# Patient Record
Sex: FEMALE | Race: WHITE | NOT HISPANIC OR LATINO | Employment: STUDENT | URBAN - METROPOLITAN AREA
[De-identification: names, ages, dates, MRNs, and addresses within clinical notes are randomized per-mention and may not be internally consistent; named-entity substitution may affect disease eponyms.]

---
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
## 2023-01-30 ENCOUNTER — OFFICE VISIT (OUTPATIENT)
Dept: URGENT CARE | Facility: CLINIC | Age: 18
End: 2023-01-30
Payer: COMMERCIAL

## 2023-01-30 VITALS
HEIGHT: 63 IN | BODY MASS INDEX: 17.72 KG/M2 | OXYGEN SATURATION: 98 % | TEMPERATURE: 98 F | SYSTOLIC BLOOD PRESSURE: 106 MMHG | WEIGHT: 100 LBS | HEART RATE: 98 BPM | DIASTOLIC BLOOD PRESSURE: 72 MMHG

## 2023-01-30 DIAGNOSIS — J32.9 SINUSITIS, UNSPECIFIED CHRONICITY, UNSPECIFIED LOCATION: Primary | ICD-10-CM

## 2023-01-30 DIAGNOSIS — J30.9 ALLERGIC RHINITIS, UNSPECIFIED SEASONALITY, UNSPECIFIED TRIGGER: ICD-10-CM

## 2023-01-30 DIAGNOSIS — R05.9 COUGH, UNSPECIFIED TYPE: ICD-10-CM

## 2023-01-30 LAB
CTP QC/QA: YES
SARS-COV-2 AG RESP QL IA.RAPID: NEGATIVE

## 2023-01-30 PROCEDURE — 1160F RVW MEDS BY RX/DR IN RCRD: CPT | Mod: CPTII,S$GLB,, | Performed by: FAMILY MEDICINE

## 2023-01-30 PROCEDURE — 3078F DIAST BP <80 MM HG: CPT | Mod: CPTII,S$GLB,, | Performed by: FAMILY MEDICINE

## 2023-01-30 PROCEDURE — 3078F PR MOST RECENT DIASTOLIC BLOOD PRESSURE < 80 MM HG: ICD-10-PCS | Mod: CPTII,S$GLB,, | Performed by: FAMILY MEDICINE

## 2023-01-30 PROCEDURE — 3074F PR MOST RECENT SYSTOLIC BLOOD PRESSURE < 130 MM HG: ICD-10-PCS | Mod: CPTII,S$GLB,, | Performed by: FAMILY MEDICINE

## 2023-01-30 PROCEDURE — 1159F PR MEDICATION LIST DOCUMENTED IN MEDICAL RECORD: ICD-10-PCS | Mod: CPTII,S$GLB,, | Performed by: FAMILY MEDICINE

## 2023-01-30 PROCEDURE — 87811 SARS-COV-2 COVID19 W/OPTIC: CPT | Mod: QW,S$GLB,, | Performed by: FAMILY MEDICINE

## 2023-01-30 PROCEDURE — 3074F SYST BP LT 130 MM HG: CPT | Mod: CPTII,S$GLB,, | Performed by: FAMILY MEDICINE

## 2023-01-30 PROCEDURE — 99214 OFFICE O/P EST MOD 30 MIN: CPT | Mod: S$GLB,,, | Performed by: FAMILY MEDICINE

## 2023-01-30 PROCEDURE — 3008F PR BODY MASS INDEX (BMI) DOCUMENTED: ICD-10-PCS | Mod: CPTII,S$GLB,, | Performed by: FAMILY MEDICINE

## 2023-01-30 PROCEDURE — 99214 PR OFFICE/OUTPT VISIT, EST, LEVL IV, 30-39 MIN: ICD-10-PCS | Mod: S$GLB,,, | Performed by: FAMILY MEDICINE

## 2023-01-30 PROCEDURE — 87811 SARS CORONAVIRUS 2 ANTIGEN POCT, MANUAL READ: ICD-10-PCS | Mod: QW,S$GLB,, | Performed by: FAMILY MEDICINE

## 2023-01-30 PROCEDURE — 3008F BODY MASS INDEX DOCD: CPT | Mod: CPTII,S$GLB,, | Performed by: FAMILY MEDICINE

## 2023-01-30 PROCEDURE — 1159F MED LIST DOCD IN RCRD: CPT | Mod: CPTII,S$GLB,, | Performed by: FAMILY MEDICINE

## 2023-01-30 PROCEDURE — 1160F PR REVIEW ALL MEDS BY PRESCRIBER/CLIN PHARMACIST DOCUMENTED: ICD-10-PCS | Mod: CPTII,S$GLB,, | Performed by: FAMILY MEDICINE

## 2023-01-30 RX ORDER — IBUPROFEN 200 MG
200 TABLET ORAL
COMMUNITY
End: 2024-02-07 | Stop reason: ALTCHOICE

## 2023-01-30 RX ORDER — ACETAMINOPHEN 325 MG/1
650 TABLET ORAL
COMMUNITY

## 2023-01-30 RX ORDER — PREDNISONE 20 MG/1
40 TABLET ORAL DAILY
Qty: 6 TABLET | Refills: 0 | Status: SHIPPED | OUTPATIENT
Start: 2023-01-30 | End: 2023-02-02

## 2023-01-30 RX ORDER — AMOXICILLIN AND CLAVULANATE POTASSIUM 875; 125 MG/1; MG/1
1 TABLET, FILM COATED ORAL 2 TIMES DAILY
Qty: 14 TABLET | Refills: 0 | Status: SHIPPED | OUTPATIENT
Start: 2023-01-30 | End: 2023-02-06

## 2023-01-30 NOTE — PROGRESS NOTES
"Subjective:       Patient ID: Bee Green is a 17 y.o. female.    Vitals:  height is 5' 3" (1.6 m) and weight is 45.4 kg (100 lb). Her temperature is 98 °F (36.7 °C). Her blood pressure is 106/72 and her pulse is 98. Her oxygen saturation is 98%.     Chief Complaint: Cough    17-year-old female with history of celiac disease.  She presents with progressive symptoms of runny nose nasal and head congestion, and now right ear pain and cough.  Lots of sinus pressure and now with consistently yellow/green nasal drainage.  She reports at least several sinus infections yearly.  Describes history consistent with allergic rhinitis.  Also reports history of bronchitis, although not wheezing currently.  No fever.    Cough  The current episode started in the past 7 days. The problem has been unchanged. The problem occurs constantly. The cough is Non-productive. Associated symptoms include ear pain, nasal congestion and postnasal drip. Nothing aggravates the symptoms. She has tried OTC cough suppressant for the symptoms. The treatment provided no relief.     HENT:  Positive for ear pain and postnasal drip.    Respiratory:  Positive for cough.      Objective:      Physical Exam   Constitutional: She is oriented to person, place, and time. She appears well-developed. She is cooperative.  Non-toxic appearance. She does not appear ill. No distress.   HENT:   Head: Normocephalic and atraumatic.   Ears:   Right Ear: Hearing, external ear and ear canal normal.   Left Ear: Hearing, external ear and ear canal normal.      Comments: TMs with serous changes, no erythema  Nose: Nose normal. No mucosal edema, rhinorrhea or nasal deformity. No epistaxis. Right sinus exhibits no maxillary sinus tenderness and no frontal sinus tenderness. Left sinus exhibits no maxillary sinus tenderness and no frontal sinus tenderness.   Mouth/Throat: Uvula is midline, oropharynx is clear and moist and mucous membranes are normal. No trismus in the jaw. " Normal dentition. No uvula swelling. No oropharyngeal exudate, posterior oropharyngeal edema or posterior oropharyngeal erythema.   Eyes: Conjunctivae and lids are normal. No scleral icterus.   Neck: Trachea normal and phonation normal. Neck supple. No edema present. No erythema present. No neck rigidity present.   Cardiovascular: Normal rate, regular rhythm, normal heart sounds and normal pulses.   Pulmonary/Chest: Effort normal and breath sounds normal. No stridor. No respiratory distress. She has no decreased breath sounds. She has no wheezes. She has no rhonchi. She has no rales.   Abdominal: Normal appearance.   Musculoskeletal: Normal range of motion.         General: No deformity. Normal range of motion.   Lymphadenopathy:     She has no cervical adenopathy.   Neurological: She is alert and oriented to person, place, and time. She exhibits normal muscle tone. Coordination normal.   Skin: Skin is warm, dry, intact, not diaphoretic and not pale.   Psychiatric: Her speech is normal and behavior is normal. Judgment and thought content normal.   Nursing note and vitals reviewed.      Results for orders placed or performed in visit on 01/30/23   SARS Coronavirus 2 Antigen, POCT Manual Read   Result Value Ref Range    SARS Coronavirus 2 Antigen Negative Negative     Acceptable Yes        Assessment:       1. Sinusitis, unspecified chronicity, unspecified location    2. Cough, unspecified type    3. Allergic rhinitis, unspecified seasonality, unspecified trigger            Plan:     I have reviewed the patient's previous visits, labs, and images to look for any trends or previous treatments as part of this assessment.     Sinusitis, unspecified chronicity, unspecified location  -     amoxicillin-clavulanate 875-125mg (AUGMENTIN) 875-125 mg per tablet; Take 1 tablet by mouth 2 (two) times daily. for 7 days  Dispense: 14 tablet; Refill: 0  -     predniSONE (DELTASONE) 20 MG tablet; Take 2 tablets (40 mg  total) by mouth once daily. for 3 days  Dispense: 6 tablet; Refill: 0    Cough, unspecified type  -     SARS Coronavirus 2 Antigen, POCT Manual Read    Allergic rhinitis, unspecified seasonality, unspecified trigger    TRY A NONSEDATING ANTIHISTAMINE SUCH AS ZYRTEC OR CLARITAN OR ALLEGRA OR XYZAL (OR GENERICS FOR THESE) DAILY AS NEEDED.     TRY USING FLONASE, 2 INHALATIONS EACH NOSTRIL 1-2 TIMES DAILY, AND ON A REGULAR BASIS DURING DIFFICULT TIMES TO ADDRESS YOUR ALLERGY SYMPTOMS.       Make sure that you follow up with your primary care doctor in the next 2-5 days if needed .  Return to the Urgent Care if signs or symptoms change and certainly if you have worsening symptoms go to the nearest emergency department for further evaluation.

## 2023-01-30 NOTE — PATIENT INSTRUCTIONS
TRY A NONSEDATING ANTIHISTAMINE SUCH AS ZYRTEC OR CLARITAN OR ALLEGRA OR XYZAL (OR GENERICS FOR THESE) DAILY AS NEEDED.     TRY USING FLONASE, 2 INHALATIONS EACH NOSTRIL 1-2 TIMES DAILY, AND ON A REGULAR BASIS DURING DIFFICULT TIMES TO ADDRESS YOUR ALLERGY SYMPTOMS.       Make sure that you follow up with your primary care doctor in the next 2-5 days if needed .  Return to the Urgent Care if signs or symptoms change and certainly if you have worsening symptoms go to the nearest emergency department for further evaluation.

## 2024-02-07 ENCOUNTER — HOSPITAL ENCOUNTER (EMERGENCY)
Facility: OTHER | Age: 19
Discharge: HOME | End: 2024-02-07
Payer: COMMERCIAL

## 2024-02-07 VITALS
TEMPERATURE: 98 F | HEART RATE: 75 BPM | WEIGHT: 102 LBS | BODY MASS INDEX: 17.42 KG/M2 | SYSTOLIC BLOOD PRESSURE: 105 MMHG | DIASTOLIC BLOOD PRESSURE: 75 MMHG | OXYGEN SATURATION: 99 % | HEIGHT: 64 IN | RESPIRATION RATE: 18 BRPM

## 2024-02-07 DIAGNOSIS — S61.412A LACERATION OF LEFT PALM, INITIAL ENCOUNTER: Primary | ICD-10-CM

## 2024-02-07 LAB
B-HCG UR QL: NEGATIVE
CTP QC/QA: YES

## 2024-02-07 PROCEDURE — 12002 RPR S/N/AX/GEN/TRNK2.6-7.5CM: CPT

## 2024-02-07 PROCEDURE — 90471 IMMUNIZATION ADMIN: CPT | Performed by: PHYSICIAN ASSISTANT

## 2024-02-07 PROCEDURE — 99284 EMERGENCY DEPT VISIT MOD MDM: CPT | Mod: 25

## 2024-02-07 PROCEDURE — 81025 URINE PREGNANCY TEST: CPT | Performed by: PHYSICIAN ASSISTANT

## 2024-02-07 PROCEDURE — 90715 TDAP VACCINE 7 YRS/> IM: CPT | Performed by: PHYSICIAN ASSISTANT

## 2024-02-07 PROCEDURE — 25000003 PHARM REV CODE 250: Performed by: PHYSICIAN ASSISTANT

## 2024-02-07 PROCEDURE — 63600175 PHARM REV CODE 636 W HCPCS: Mod: JZ,JG | Performed by: PHYSICIAN ASSISTANT

## 2024-02-07 RX ORDER — CEPHALEXIN 500 MG/1
500 CAPSULE ORAL EVERY 12 HOURS
Qty: 10 CAPSULE | Refills: 0 | Status: SHIPPED | OUTPATIENT
Start: 2024-02-08 | End: 2024-02-13

## 2024-02-07 RX ORDER — LIDOCAINE HYDROCHLORIDE 10 MG/ML
10 INJECTION INFILTRATION; PERINEURAL
Status: COMPLETED | OUTPATIENT
Start: 2024-02-07 | End: 2024-02-07

## 2024-02-07 RX ORDER — KETOROLAC TROMETHAMINE 10 MG/1
10 TABLET, FILM COATED ORAL
Status: COMPLETED | OUTPATIENT
Start: 2024-02-07 | End: 2024-02-07

## 2024-02-07 RX ORDER — KETOROLAC TROMETHAMINE 10 MG/1
10 TABLET, FILM COATED ORAL EVERY 6 HOURS
Qty: 12 TABLET | Refills: 0 | Status: SHIPPED | OUTPATIENT
Start: 2024-02-07 | End: 2024-02-10

## 2024-02-07 RX ORDER — DIAZEPAM 2 MG/1
2 TABLET ORAL
Status: COMPLETED | OUTPATIENT
Start: 2024-02-07 | End: 2024-02-07

## 2024-02-07 RX ORDER — CEPHALEXIN 500 MG/1
500 CAPSULE ORAL
Status: COMPLETED | OUTPATIENT
Start: 2024-02-07 | End: 2024-02-07

## 2024-02-07 RX ORDER — BUPIVACAINE HYDROCHLORIDE 2.5 MG/ML
5 INJECTION, SOLUTION EPIDURAL; INFILTRATION; INTRACAUDAL
Status: COMPLETED | OUTPATIENT
Start: 2024-02-07 | End: 2024-02-07

## 2024-02-07 RX ADMIN — BACITRACIN ZINC, NEOMYCIN, POLYMYXIN B: 400; 3.5; 5 OINTMENT TOPICAL at 07:02

## 2024-02-07 RX ADMIN — BUPIVACAINE HYDROCHLORIDE 12.5 MG: 2.5 INJECTION, SOLUTION EPIDURAL; INFILTRATION; INTRACAUDAL; PERINEURAL at 06:02

## 2024-02-07 RX ADMIN — TETANUS TOXOID, REDUCED DIPHTHERIA TOXOID AND ACELLULAR PERTUSSIS VACCINE, ADSORBED 0.5 ML: 5; 2.5; 8; 8; 2.5 SUSPENSION INTRAMUSCULAR at 05:02

## 2024-02-07 RX ADMIN — KETOROLAC TROMETHAMINE 10 MG: 10 TABLET, FILM COATED ORAL at 06:02

## 2024-02-07 RX ADMIN — DIAZEPAM 2 MG: 2 TABLET ORAL at 05:02

## 2024-02-07 RX ADMIN — LIDOCAINE HYDROCHLORIDE 10 ML: 10 INJECTION, SOLUTION INFILTRATION; PERINEURAL at 06:02

## 2024-02-07 RX ADMIN — CEPHALEXIN 500 MG: 500 CAPSULE ORAL at 07:02

## 2024-02-07 NOTE — Clinical Note
"Bee Mcmahon" Norma was seen and treated in our emergency department on 2/7/2024.  She may return to school on 02/12/2024.      If you have any questions or concerns, please don't hesitate to call.      Sylwia Cole PA"

## 2024-02-07 NOTE — Clinical Note
"Bee Green (Caroline) was seen and treated in our emergency department on 2/7/2024.  She may return with limitations on 02/12/2024.       Sincerely,      Sylwia Cole PA"

## 2024-02-08 NOTE — ED PROVIDER NOTES
"  Source of History:  Patient    Chief complaint:  Laceration (Pt. Was moving glass panels and cut her left hand,bleeding is controlled)      HPI:  Bee Green is a 18 y.o. female presenting with left hand injury.  Patient reports that she was moving panel of glass.  States large sharp edge cause laceration to left palm.  She reports pain to the site.  States pain is worse with palpation.  She denies any pain radiation.  Denies any numbness, tingling or decreased range of motion.  She is unsure of her last tetanus vaccine.  She has not tried any medications for the symptoms    This is the extent to the patients complaints today here in the emergency department.    ROS: As per HPI    Review of patient's allergies indicates:   Allergen Reactions    Gluten protein Other (See Comments)     Celiac disease       PMH:  As per HPI and below:  Past Medical History:   Diagnosis Date    Celiac disease      No past surgical history on file.    Social History     Tobacco Use    Smoking status: Never    Smokeless tobacco: Never   Substance Use Topics    Alcohol use: Never    Drug use: Yes     Types: Marijuana       Physical Exam:    /75 (BP Location: Right arm, Patient Position: Lying)   Pulse 75   Temp 98 °F (36.7 °C) (Oral)   Resp 18   Ht 5' 4" (1.626 m)   Wt 46.3 kg (102 lb)   SpO2 99%   BMI 17.51 kg/m²   Nursing note and vital signs reviewed.  Constitutional:  Appears very anxious  Eyes: No conjunctival injection.  Extraocular muscles are intact.  ENT: Normal phonation.  Musculoskeletal:  Linear laceration to the medial thenar eminence that extends to the webspace in between the 1st and 2nd digit.  No obvious bony involvement.  No obvious tendon involvement against resisted range of motion.  Bleeding controlled with direct pressure.  No signs of arterial involvement.  Capillary refill intact.  Skin: + laceration, No rashes seen.  Good turgor.  No abrasions.  No ecchymoses.  Psych: Appropriate, " conversant.        I decided to obtain the patient's medical records.    Results for orders placed or performed during the hospital encounter of 02/07/24   POCT urine pregnancy   Result Value Ref Range    POC Preg Test, Ur Negative Negative     Acceptable Yes      Imaging Results              X-Ray Hand 3 view Left (Final result)  Result time 02/07/24 19:30:09      Final result by Shelton Oliver MD (02/07/24 19:30:09)                   Impression:      No acute osseous abnormality identified.      Electronically signed by: Shelton Oliver MD  Date:    02/07/2024  Time:    19:30               Narrative:    EXAMINATION:  XR HAND COMPLETE 3 VIEW LEFT    CLINICAL HISTORY:  laceration;.    TECHNIQUE:  PA, lateral, and oblique views of the left hand were performed.    COMPARISON:  None    FINDINGS:  No evidence of acute displaced fracture, dislocation, or osseous destructive process.  No radiopaque retained foreign body seen.                                  Lac Repair    Date/Time: 2/7/2024 6:32 PM    Performed by: Sylwia Cole PA  Authorized by: Sylwia Cole PA    Consent:     Consent obtained:  Verbal    Consent given by:  Patient    Risks discussed:  Infection, pain and poor wound healing  Universal protocol:     Patient identity confirmed:  Verbally with patient  Anesthesia:     Anesthesia method:  Local infiltration    Local anesthetic:  Bupivacaine 0.25% w/o epi and lidocaine 1% w/o epi  Laceration details:     Location:  Hand    Length (cm):  3    Depth (mm):  2  Pre-procedure details:     Preparation:  Patient was prepped and draped in usual sterile fashion  Exploration:     Hemostasis achieved with:  Direct pressure    Imaging obtained: x-ray      Imaging outcome: foreign body not noted      Wound extent: no tendon damage noted and no underlying fracture noted    Treatment:     Area cleansed with:  Povidone-iodine and saline    Amount of cleaning:  Standard    Irrigation method:   Pressure wash    Debridement:  None  Skin repair:     Repair method:  Sutures    Suture size:  3-0    Suture material:  Nylon    Suture technique:  Simple interrupted    Number of sutures:  7  Approximation:     Approximation:  Close  Repair type:     Repair type:  Simple  Post-procedure details:     Dressing:  Antibiotic ointment and bulky dressing    Procedure completion:  Tolerated well, no immediate complications        MDM:    Bee Green 18 y.o. presented to the ED with c/o laceration. Physical exam reveals patient who appears quite anxious in some distress secondary to pain  Linear laceration to the medial thenar eminence that extends to the webspace in between the 1st and 2nd digit.  No obvious bony involvement.  No obvious tendon involvement against resisted range of motion.  Bleeding controlled with direct pressure.  No signs of arterial involvement.  Capillary refill intact.     DDX:  Laceration, simple versus complicated, tendon injury, fracture,    ED management: wound cleaned and irrigated, lac repair; see procedure note.  Toradol and Valium ordered in the as she appeared quite anxious.  X-ray with no bony involvement or radiopaque foreign body noted. She was counseled about the risk of possible retained foreign body however lower suspicion given mechanism.  Tetanus updated in the ER.  Will initiate Keflex prophylactically given location and mechanism.  Discussed continued home care and signs and symptoms to watch for for infection      Impression/Plan: The encounter diagnosis was Laceration of left palm, initial encounter. Patient cautioned on when to return to ED.  Pt. Understands and agrees with current treatment plan         Diagnostic Impression:    1. Laceration of left palm, initial encounter         ED Disposition Condition    Discharge Stable            ED Prescriptions       Medication Sig Dispense Start Date End Date Auth. Provider    ketorolac (TORADOL) 10 mg tablet Take 1 tablet (10  mg total) by mouth every 6 (six) hours. for 3 days 12 tablet 2/7/2024 2/10/2024 Sylwia Cole PA    cephALEXin (KEFLEX) 500 MG capsule Take 1 capsule (500 mg total) by mouth every 12 (twelve) hours. for 5 days 10 capsule 2/8/2024 2/13/2024 Sylwia Cole PA          Follow-up Information       Follow up With Specialties Details Why Contact Info Additional Information    [facility] - Internal Medicine Internal Medicine Go to  7-10 days for suture removal [address] [id number] [phone] Internal Medicine - [facility], 8th Floor, [address] Please park in [place] and use [place] elevators    [facility] Emergency Dept Emergency Medicine  If symptoms worsen [address] [id number] [phone]             Please pardon typos or dictation errors, as this note was transcribed using M*Modal fluency direct dictation software.      Sylwia Cole PA  02/07/24 [id number]

## 2024-02-10 ENCOUNTER — NURSE TRIAGE (OUTPATIENT)
Dept: ADMINISTRATIVE | Facility: CLINIC | Age: 19
End: 2024-02-10
Payer: COMMERCIAL

## 2024-02-10 ENCOUNTER — HOSPITAL ENCOUNTER (EMERGENCY)
Facility: OTHER | Age: 19
Discharge: HOME | End: 2024-02-10
Attending: EMERGENCY MEDICINE
Payer: COMMERCIAL

## 2024-02-10 VITALS
TEMPERATURE: 98 F | HEIGHT: 64 IN | RESPIRATION RATE: 18 BRPM | HEART RATE: 100 BPM | DIASTOLIC BLOOD PRESSURE: 76 MMHG | BODY MASS INDEX: 17.07 KG/M2 | OXYGEN SATURATION: 99 % | WEIGHT: 100 LBS | SYSTOLIC BLOOD PRESSURE: 112 MMHG

## 2024-02-10 DIAGNOSIS — Z51.89 VISIT FOR WOUND CHECK: Primary | ICD-10-CM

## 2024-02-10 PROCEDURE — 25000003 PHARM REV CODE 250

## 2024-02-10 PROCEDURE — 99283 EMERGENCY DEPT VISIT LOW MDM: CPT

## 2024-02-10 RX ORDER — IBUPROFEN 600 MG/1
600 TABLET ORAL
Status: COMPLETED | OUTPATIENT
Start: 2024-02-10 | End: 2024-02-10

## 2024-02-10 RX ADMIN — IBUPROFEN 600 MG: 600 TABLET, FILM COATED ORAL at 01:02

## 2024-02-10 NOTE — ED PROVIDER NOTES
Encounter Date: 2/10/2024       History     Chief Complaint   Patient presents with    Wound Check     Pt states that she was here two days ago and had stitches placed in her hand and now has severe bruising and wants to get it checked.     Bee Green is a 18 y.o. female presenting to the emergency department for evaluation of wound check. Patient states that she noticed bruising around her left hand this morning. She wanted to know if this is part of the normal healing process or if the area is infected.  Reports that she was seen in the ED two days ago for laceration to the palm of her left hand. Laceration was repaired with stiches. She states that she has been keeping the area clean with water. She denies any fever, chills, drainage, redness, or paresthesias.       The history is provided by the patient.     Review of patient's allergies indicates:   Allergen Reactions    Gluten protein Other (See Comments)     Celiac disease     Past Medical History:   Diagnosis Date    Celiac disease      No past surgical history on file.  No family history on file.  Social History     Tobacco Use    Smoking status: Never    Smokeless tobacco: Never   Substance Use Topics    Alcohol use: Never    Drug use: Yes     Types: Marijuana     Review of Systems   Constitutional:  Negative for chills and fever.   HENT:  Negative for congestion, rhinorrhea and sore throat.    Respiratory:  Negative for cough and shortness of breath.    Cardiovascular:  Negative for chest pain.   Gastrointestinal:  Negative for abdominal pain, diarrhea, nausea and vomiting.   Genitourinary:  Negative for dysuria, frequency and urgency.   Musculoskeletal:  Negative for back pain.   Skin:  Positive for wound (left palm). Negative for rash.   Neurological:  Negative for dizziness, numbness and headaches.   Psychiatric/Behavioral:  Negative for confusion.        Physical Exam     Initial Vitals [02/10/24 1213]   BP Pulse Resp Temp SpO2   112/76 100 18 98  °F (36.7 °C) 99 %      MAP       --         Physical Exam    Nursing note and vitals reviewed.  Constitutional: She appears well-developed and well-nourished. No distress.   HENT:   Head: Normocephalic and atraumatic.   Mouth/Throat: Oropharynx is clear and moist.   Eyes: Conjunctivae and EOM are normal.   Neck: Neck supple.   Normal range of motion.  Cardiovascular:            Pulses:       Radial pulses are 2+ on the left side.   Musculoskeletal:         General: No tenderness or edema. Normal range of motion.      Cervical back: Normal range of motion and neck supple.      Comments: Normal ROM of left hand. Patient holding hand in contracted position because of weird sensation of sutures in her palm.      Neurological: She is alert and oriented to person, place, and time. She has normal strength. No cranial nerve deficit or sensory deficit.   Skin: Skin is warm and dry. Capillary refill takes less than 2 seconds.   7 sutures in the palm of left hand are clean, dry and intact. No surrounding cellulitis. No drainage.    Psychiatric: She has a normal mood and affect. Her behavior is normal. Judgment and thought content normal.         ED Course   Procedures  Labs Reviewed - No data to display       Imaging Results    None          Medications   ibuprofen tablet 600 mg (600 mg Oral Given 2/10/24 1477)     Medical Decision Making  Risk  Prescription drug management.                          Medical Decision Making:   Initial Assessment:   Urgent evaluation of 18 year old female presenting to the ED for wound check. Reports that she was seen in the ED two days ago after she sustained a laceration to the palm of the left hand after a large piece of glass hit and cut her hand. She reports that bruising formed around the incision site this morning. She denies fever, chills, drainage from the wound or paresthesias. On exam, she is well appearing and non toxic. Hemodynamically stable. Afebrile in the ED. She does have  ecchymoses to the palm of the left hand that surround the wound. No drainage or surrounding cellulitis. Neurovascularly intact. Sutures are clean, dry and intact.   ED Management:  Patient give ice in the ED to apply to palm. Also given dose of ibuprofen for pain and swelling. Advised her to continue keeping the area clean. Given proper wound care instructions. Advised her to continue taking ibuprofen as needed for pain and swelling. Return for suture removal in 5-7 days.  Patient verbalized understanding and agreement with this plan of care. She was given specific return precautions. Advised to follow up with PCP as needed. All questions and concerns addressed. She is stable for discharge.     This note was created with MModal Fluency Direct Dictation. Please excuse any spelling or grammatical errors.               Clinical Impression:  Final diagnoses:  [Z51.89] Visit for wound check (Primary)          ED Disposition Condition    Discharge Stable          ED Prescriptions    None       Follow-up Information    None          Porter Walton PA-C  02/11/24 1137

## 2024-02-10 NOTE — ED TRIAGE NOTES
Pt seen 3 days ago and stitches placed to lac on L palm. Pt now c/o bruising and mild swelling to area. No erythema noted, no s/s of infection.

## 2024-02-10 NOTE — TELEPHONE ENCOUNTER
"Pt was seen in ED on 02/07/24 for a laceration of her left palm. She received stitches and was discharged. Pt is now experiencing new-onset swelling and hand is starting to look "blue." She states that she is able to move all of her fingers but notes worsening numbness to her palm and near her left thumb.    Care Advice recommends that pt go to ED now. She verbalizes understanding and is instructed to call back with any new/worsening sxs, questions, or concerns.   Reason for Disposition   Wound causes numbness (i.e., loss of sensation)    Additional Information   Negative: [1] Major bleeding (e.g., actively dripping or spurting) AND [2] can't be stopped   Negative: Amputation   Negative: Shock suspected (e.g., cold/pale/clammy skin, too weak to stand, low BP, rapid pulse)   Negative: [1] Knife wound (or other possibly deep cut) AND [2] to chest, abdomen, back, neck, or head   Negative: [1] Self-injury (e.g., cutting, self-harm) AND [2] suicidal or out-of-control   Negative: Sounds like a life-threatening emergency to the triager   Negative: [1] Bleeding AND [2] won't stop after 10 minutes of direct pressure (using correct technique)   Negative: Skin is split open or gaping (or length > 1/2 inch or 12 mm on the skin, 1/4 inch or 6 mm on the face)   Negative: [1] Deep cut AND [2] can see bone or tendons   Negative: Cut over knuckle (MCP joint)   Negative: Sensation of something in the wound (i.e., retained object in wound)   Negative: [1] Dirt in the wound AND [2] not removed with 15 minutes of scrubbing    Protocols used: Cuts and [id number]    "

## 2024-02-10 NOTE — DISCHARGE INSTRUCTIONS
Please complete your antibiotics. Keep the area clean with water and  mild unscented soap. Take ibuprofen or ketorolac as needed for pain and swelling.

## 2024-02-19 ENCOUNTER — OFFICE VISIT (OUTPATIENT)
Dept: URGENT CARE | Facility: CLINIC | Age: 19
End: 2024-02-19
Payer: COMMERCIAL

## 2024-02-19 VITALS
DIASTOLIC BLOOD PRESSURE: 79 MMHG | RESPIRATION RATE: 14 BRPM | SYSTOLIC BLOOD PRESSURE: 116 MMHG | HEART RATE: 104 BPM | BODY MASS INDEX: 17.07 KG/M2 | HEIGHT: 64 IN | OXYGEN SATURATION: 98 % | WEIGHT: 100 LBS | TEMPERATURE: 99 F

## 2024-02-19 DIAGNOSIS — Z48.02 VISIT FOR SUTURE REMOVAL: Primary | ICD-10-CM

## 2024-02-19 PROCEDURE — 99213 OFFICE O/P EST LOW 20 MIN: CPT | Mod: S$GLB,,, | Performed by: FAMILY MEDICINE

## 2024-02-19 PROCEDURE — 99024 POSTOP FOLLOW-UP VISIT: CPT | Mod: S$GLB,,, | Performed by: FAMILY MEDICINE

## 2024-02-19 NOTE — PROCEDURES
Suture Removal    Date/Time: 2/19/2024 5:00 PM  Location procedure was performed: [facility] EMERGENCY DEPARTMENT    Performed by: Kurt Currie MD  Authorized by: Kurt Currie MD  Body area: upper extremity  Location details: left hand  Wound Appearance: clean and well healed  Sutures Removed: 7  Post-removal: Steri-Strips applied  Complications: No  Estimated blood loss (mL): 0  Specimens: No  Implants: No  Patient tolerance: Patient tolerated the procedure well with no immediate complications

## 2024-02-19 NOTE — PROGRESS NOTES
"Subjective:      Patient ID: Bee Green is a 18 y.o. female.    Vitals:  height is 5' 4" (1.626 m) and weight is 45.4 kg (100 lb). Her oral temperature is 98.6 °F (37 °C). Her blood pressure is 116/79 and her pulse is 104. Her respiration is 14 and oxygen saturation is 98%.     Chief Complaint: Suture / Staple Removal    Pt is here today for suture removal in left hand. Pt states the sutures has been in place for about 13 days.  Patient denies any complications    Suture / Staple Removal  The sutures were placed 11 to 14 days ago. She tried antibiotic ointment use since the wound repair. There has been no drainage from the wound. There is no redness present. There is no swelling present. Pain course: slight pain at the sight of the wound when touched. She has no difficulty moving the affected extremity or digit.       Skin:  Negative for erythema.      Objective:     Physical Exam   Constitutional: She is oriented to person, place, and time. She appears well-developed.   HENT:   Head: Normocephalic and atraumatic. Head is without abrasion, without contusion and without laceration.   Ears:   Right Ear: External ear normal.   Left Ear: External ear normal.   Nose: Nose normal.   Mouth/Throat: Oropharynx is clear and moist and mucous membranes are normal.   Eyes: Conjunctivae, EOM and lids are normal. Pupils are equal, round, and reactive to light.   Neck: Trachea normal and phonation normal. Neck supple.   Cardiovascular: Normal rate, regular rhythm and normal heart sounds.   Pulmonary/Chest: Effort normal and breath sounds normal. No stridor. No respiratory distress.   Musculoskeletal: Normal range of motion.         General: Normal range of motion.   Neurological: She is alert, oriented to person, place, and time and at baseline.   Skin: Skin is warm, dry, intact and no rash. Capillary refill takes less than 2 seconds. No abrasion, No burn, No bruising, No erythema and No ecchymosis         Comments:   Left " hand:  7 Prolene stitches in place.  Wound looks clean  No redness, swelling, discharge.    Full ROM.    Neurovascular intact.     Psychiatric: Her speech is normal and behavior is normal. Judgment and thought content normal.   Nursing note and vitals reviewed.      Assessment:     1. Visit for suture removal        Plan:       Visit for suture removal  -     Suture Removal

## 2024-04-22 ENCOUNTER — OFFICE VISIT (OUTPATIENT)
Dept: URGENT CARE | Facility: CLINIC | Age: 19
End: 2024-04-22
Payer: COMMERCIAL

## 2024-04-22 VITALS
DIASTOLIC BLOOD PRESSURE: 77 MMHG | RESPIRATION RATE: 20 BRPM | BODY MASS INDEX: 17.07 KG/M2 | HEIGHT: 64 IN | HEART RATE: 99 BPM | OXYGEN SATURATION: 99 % | WEIGHT: 100 LBS | TEMPERATURE: 99 F | SYSTOLIC BLOOD PRESSURE: 116 MMHG

## 2024-04-22 DIAGNOSIS — S09.90XA INJURY OF HEAD, INITIAL ENCOUNTER: ICD-10-CM

## 2024-04-22 DIAGNOSIS — R42 DIZZINESS: Primary | ICD-10-CM

## 2024-04-22 DIAGNOSIS — R41.840 IMPAIRED CONCENTRATION: ICD-10-CM

## 2024-04-22 DIAGNOSIS — R55 SYNCOPE AND COLLAPSE: ICD-10-CM

## 2024-04-22 LAB
B-HCG UR QL: NEGATIVE
CTP QC/QA: YES
GLUCOSE SERPL-MCNC: 93 MG/DL (ref 70–110)

## 2024-04-22 PROCEDURE — 81025 URINE PREGNANCY TEST: CPT | Mod: S$GLB,,, | Performed by: FAMILY MEDICINE

## 2024-04-22 PROCEDURE — 82962 GLUCOSE BLOOD TEST: CPT | Mod: S$GLB,,, | Performed by: FAMILY MEDICINE

## 2024-04-22 PROCEDURE — 99214 OFFICE O/P EST MOD 30 MIN: CPT | Mod: S$GLB,,, | Performed by: FAMILY MEDICINE

## 2024-04-22 NOTE — PROGRESS NOTES
"Subjective:      Patient ID: Bee Green is a 18 y.o. female.    Vitals:  height is 5' 4" (1.626 m) and weight is 45.4 kg (100 lb). Her oral temperature is 98.5 °F (36.9 °C). Her blood pressure is 116/77 and her pulse is 99. Her respiration is 20 and oxygen saturation is 99%.     Chief Complaint: Head Injury    Patient states that 4 days ago while she was [place] friends at beach felt dizzy and fell x3, blacking out for few seconds each time, within a span of 15 minutes.  States that she has been feeling loss of consideration mild headache and wooziness since then.  Denies any loss of memory, nausea/vomiting.  Denies any seizure-like activity.    Head Injury   Incident onset: 4 days ago. The injury mechanism was a fall (pt fainted). She lost consciousness for a period of 1 to 5 minutes. There was no blood loss. The quality of the pain is described as aching and dull. The pain is at a severity of 5/10. The pain is moderate. The pain has been fluctuating since the injury. Associated symptoms include disorientation, headaches and weakness. Treatments tried: advil. The treatment provided mild relief.       Neurological:  Positive for headaches and disorientation.   Psychiatric/Behavioral:  Positive for disorientation.       Objective:     Physical Exam   Constitutional: She is oriented to person, place, and time. She appears well-developed.  Non-toxic appearance. She does not appear ill. No distress.   HENT:   Head: Normocephalic and atraumatic.   Ears:   Right Ear: Hearing, tympanic membrane, external ear and ear canal normal. no impacted cerumen  Left Ear: Hearing, tympanic membrane, external ear and ear canal normal. no impacted cerumen  Nose: Nose normal. No mucosal edema, rhinorrhea, nasal deformity or congestion. No epistaxis. Right sinus exhibits no maxillary sinus tenderness and no frontal sinus tenderness. Left sinus exhibits no maxillary sinus tenderness and no frontal sinus tenderness.   Mouth/Throat: Uvula " is midline, oropharynx is clear and moist and mucous membranes are normal. No trismus in the jaw. Normal dentition. No uvula swelling. No oropharyngeal exudate or posterior oropharyngeal erythema.   Eyes: Conjunctivae, EOM and lids are normal. Pupils are equal, round, and reactive to light. No scleral icterus.   Neck: Trachea normal and phonation normal. Neck supple. Carotid bruit is not present. No neck rigidity present.   Cardiovascular: Normal rate, regular rhythm, normal heart sounds and normal pulses.   No murmur heard.  Pulmonary/Chest: Effort normal and breath sounds normal. No stridor. No respiratory distress. She has no wheezes. She has no rhonchi. She has no rales.   Abdominal: Normal appearance and bowel sounds are normal. She exhibits no distension. Soft. There is no abdominal tenderness.   Musculoskeletal: Normal range of motion.         General: No deformity. Normal range of motion.      Cervical back: She exhibits no tenderness.   Lymphadenopathy:     She has no cervical adenopathy.   Neurological: She is alert and oriented to person, place, and time. She displays no weakness. No cranial nerve deficit or sensory deficit. She exhibits normal muscle tone. Coordination and gait normal.   Skin: Skin is warm, dry, intact, not diaphoretic and not pale.   Psychiatric: Her speech is normal and behavior is normal. Mood, judgment and thought content normal.   Nursing note and vitals reviewed.      Assessment:     1. Dizziness    2. Injury of head, initial encounter    3. Impaired concentration    4. Syncope and collapse        Plan:   Discussed exam findings/results/diagnosis/plan with patient. CT head is ordered for syncope.  Head injury observation precautions discussed in detail.Advised to f/u with PCP within 2-5 days. ER precautions given if symptoms get any worse. All questions answered. Patient verbally understood and agreed with treatment plan.  Educational materials and instructions regarding the visit  diagnosis and management provided.     Dizziness  -     POCT urine pregnancy  -     POCT Glucose, Hand-Held Device    Injury of head, initial encounter    Impaired concentration    Syncope and collapse  -     CT Head Without Contrast; Future; Expected date: 04/22/2024

## 2024-04-22 NOTE — LETTER
April 24, 2024      [facility]  [address] [address]  Phone: [phone]  Fax: [phone]       Patient: Bee Green   YOB: 2005  Date of Visit: 4/22/24    To Whom It May Concern:    Michele Green  was at [facility] on 4/22/24. The patient may return to work/school on with following restrictions. She should be given additional time to complete her assignments as she is recovering from possible concussion. If you have any questions or concerns, or if I can be of further assistance, please do not hesitate to contact me.    Sincerely,    Kurt Currie MD

## 2024-04-24 ENCOUNTER — PATIENT MESSAGE (OUTPATIENT)
Dept: URGENT CARE | Facility: CLINIC | Age: 19
End: 2024-04-24
Payer: COMMERCIAL

## 2024-04-24 ENCOUNTER — HOSPITAL ENCOUNTER (OUTPATIENT)
Dept: RADIOLOGY | Facility: OTHER | Age: 19
Discharge: HOME | End: 2024-04-24
Attending: FAMILY MEDICINE
Payer: COMMERCIAL

## 2024-04-24 DIAGNOSIS — R55 SYNCOPE AND COLLAPSE: ICD-10-CM

## 2024-04-24 PROCEDURE — 70450 CT HEAD/BRAIN W/O DYE: CPT | Mod: 26,,, | Performed by: RADIOLOGY

## 2024-04-24 PROCEDURE — 70450 CT HEAD/BRAIN W/O DYE: CPT | Mod: TC

## 2024-04-26 NOTE — TELEPHONE ENCOUNTER
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 She may print it from there